# Patient Record
Sex: MALE | Race: WHITE
[De-identification: names, ages, dates, MRNs, and addresses within clinical notes are randomized per-mention and may not be internally consistent; named-entity substitution may affect disease eponyms.]

---

## 2018-03-01 ENCOUNTER — HOSPITAL ENCOUNTER (OUTPATIENT)
Dept: HOSPITAL 92 - ULT | Age: 38
Discharge: HOME | End: 2018-03-01
Attending: FAMILY MEDICINE
Payer: COMMERCIAL

## 2018-03-01 DIAGNOSIS — N50.819: Primary | ICD-10-CM

## 2018-03-01 DIAGNOSIS — N43.3: ICD-10-CM

## 2018-03-01 PROCEDURE — 93976 VASCULAR STUDY: CPT

## 2018-03-01 PROCEDURE — 76870 US EXAM SCROTUM: CPT

## 2018-03-01 NOTE — ULT
TESTICULAR ULTRASOUND:

 

Date:  03/01/18 

 

HISTORY:  

Testicular pain. 

 

FINDINGS:

Right testicle measures 5.3 cm x 3.4 cm x 2.8 cm. Left testicle measures 5.2 cm x 3.5 cm x 2.9 cm. Th
ere is slight heterogeneity of each testicle, which is a symmetric finding bilaterally. No testicular
 mass is present. There are a few very tiny punctate foci of increased echogenicity in the left testi
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 

 

Doppler evaluation of each testicle with spectral analysis and color flow evaluation demonstrates art
erial and venous flow in each testicle. 

 

There is a small hypoechoic structure within the right epididymis measuring 0.5 cm, which may represe
nt a small spermatocele. There is also a small anechoic structure seen in the left epididymal head me
asuring 0.3 cm, probably related to a small epididymal cyst. 

 

There is a small left hydrocele with physiologic amount of fluid adjacent to the right testicle. 

 

IMPRESSION: 

1.  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 Testicles otherwise have a normal sonographi
c appearance with arterial and venous flow documented in each testicle. 

 

2.  Small epididymal cyst versus spermatocele in the right epididymal head with tiny epididymal cyst 
in the left epididymal head. 

 

3.  Very small left hydrocele. 

 

 

 

POS: [facility]

## 2019-03-07 ENCOUNTER — HOSPITAL ENCOUNTER (OUTPATIENT)
Dept: HOSPITAL 92 - SDC | Age: 39
Discharge: HOME | End: 2019-03-07
Attending: OTOLARYNGOLOGY
Payer: COMMERCIAL

## 2019-03-07 VITALS — BODY MASS INDEX: 39.9 KG/M2

## 2019-03-07 DIAGNOSIS — J34.2: ICD-10-CM

## 2019-03-07 DIAGNOSIS — J34.3: ICD-10-CM

## 2019-03-07 DIAGNOSIS — Z79.899: ICD-10-CM

## 2019-03-07 DIAGNOSIS — J32.9: Primary | ICD-10-CM

## 2019-03-07 DIAGNOSIS — F32.9: ICD-10-CM

## 2019-03-07 DIAGNOSIS — F90.9: ICD-10-CM

## 2019-03-07 DIAGNOSIS — J33.8: ICD-10-CM

## 2019-03-07 DIAGNOSIS — J34.89: ICD-10-CM

## 2019-03-07 PROCEDURE — 09TV8ZZ RESECTION OF LEFT ETHMOID SINUS, VIA NATURAL OR ARTIFICIAL OPENING ENDOSCOPIC: ICD-10-PCS | Performed by: OTOLARYNGOLOGY

## 2019-03-07 PROCEDURE — 09TL8ZZ RESECTION OF NASAL TURBINATE, VIA NATURAL OR ARTIFICIAL OPENING ENDOSCOPIC: ICD-10-PCS | Performed by: OTOLARYNGOLOGY

## 2019-03-07 PROCEDURE — 099R8ZZ DRAINAGE OF LEFT MAXILLARY SINUS, VIA NATURAL OR ARTIFICIAL OPENING ENDOSCOPIC: ICD-10-PCS | Performed by: OTOLARYNGOLOGY

## 2019-03-07 PROCEDURE — 099X8ZZ DRAINAGE OF LEFT SPHENOID SINUS, VIA NATURAL OR ARTIFICIAL OPENING ENDOSCOPIC: ICD-10-PCS | Performed by: OTOLARYNGOLOGY

## 2019-03-07 PROCEDURE — 099T8ZZ DRAINAGE OF LEFT FRONTAL SINUS, VIA NATURAL OR ARTIFICIAL OPENING ENDOSCOPIC: ICD-10-PCS | Performed by: OTOLARYNGOLOGY

## 2019-03-07 PROCEDURE — 09TU8ZZ RESECTION OF RIGHT ETHMOID SINUS, VIA NATURAL OR ARTIFICIAL OPENING ENDOSCOPIC: ICD-10-PCS | Performed by: OTOLARYNGOLOGY

## 2019-03-07 PROCEDURE — 099W8ZZ DRAINAGE OF RIGHT SPHENOID SINUS, VIA NATURAL OR ARTIFICIAL OPENING ENDOSCOPIC: ICD-10-PCS | Performed by: OTOLARYNGOLOGY

## 2019-03-07 PROCEDURE — 099Q8ZZ DRAINAGE OF RIGHT MAXILLARY SINUS, VIA NATURAL OR ARTIFICIAL OPENING ENDOSCOPIC: ICD-10-PCS | Performed by: OTOLARYNGOLOGY

## 2019-03-07 PROCEDURE — 09SL8ZZ REPOSITION NASAL TURBINATE, VIA NATURAL OR ARTIFICIAL OPENING ENDOSCOPIC: ICD-10-PCS | Performed by: OTOLARYNGOLOGY

## 2019-03-07 PROCEDURE — 099S8ZZ DRAINAGE OF RIGHT FRONTAL SINUS, VIA NATURAL OR ARTIFICIAL OPENING ENDOSCOPIC: ICD-10-PCS | Performed by: OTOLARYNGOLOGY

## 2019-03-07 PROCEDURE — 09SM0ZZ REPOSITION NASAL SEPTUM, OPEN APPROACH: ICD-10-PCS | Performed by: OTOLARYNGOLOGY

## 2019-03-08 NOTE — OP
DATE OF PROCEDURE:  03/07/2019



PREOPERATIVE DIAGNOSES:  Chronic sinusitis, nasal polyposis, bilateral beronica

bullosa, deviated septum with spur, hypertrophied inferior turbinates. 



POSTOPERATIVE DIAGNOSES:  Chronic sinusitis, nasal polyposis, bilateral beronica

bullosa, deviated septum with spur, hypertrophied inferior turbinates. 



PROCEDURES PERFORMED:  

1. Bilateral nasal endoscopy with resection of beronica bullosa.

2. Bilateral nasal endoscopy with total ethmoidectomy.

3. Bilateral nasal endoscopy with maxillary antrostomy.

4. Bilateral nasal endoscopy with sphenoidotomy.

5. Bilateral nasal endoscopy with frontal sinusotomy.

6. Septoplasty.

7. Bilateral nasal endoscopy with submucosal resection of inferior turbinates.



DESCRIPTION OF PROCEDURE:  BILATERAL NASAL ENDOSCOPY WITH RESECTION OF BERONICA

BULLOSA: 



The beronica bullosa was identified and entered with a sickle blade.  The lateral

aspect of the beronica bullosa was meticulously resected while leaving the medial most

aspect to form the new middle turbinate.  Attention was made not to violate the

mucosa.  The straight biting punches and micro-debrider were used to remove shrouds

of mucosa and bony debris. 



BILATERAL NASAL ENDOSCOPY WITH TOTAL ETHMOIDECTOMY:   



The anterior face of the ethmoid bulla was entered and with the micro-debrider,

dissection continued posteriorly to the ground lamella.  The limits of dissection

included the insertion of the middle turbinate, medial orbital wall, and base of

skull.  We similarly identified the frontal recess and removed shrouds of bone and

debris in that region to obtain patency into the agger nasi region and frontal

recess.  We then entered the ground lamella and its anteroinferior aspect and

proceeded posteriorly, opening the posterior ethmoid air-cell system.  Again, the

limits of dissection included the base of skull and medial orbital wall. 



BILATERAL NASAL ENDOSCOPY WITH MAXILLARY ANTROSTOMY:   



The uncinate was then identified and the extent of the uncinate was appreciated by

out-fracturing the uncinate with the ball-tip probe.  We then used the sickle blade

to disarticulate the uncinate from the lateral nasal wall.  This was then removed

with straight biting and upbiting punches with the remaining shrouds of mucosa and

bony septum removed with the micro-debrider.  The natural os of the maxillary sinus

was then identified and enlarged with the maxillary punches and back biting forceps. 



BILATERAL NASAL ENDOSCOPY WITH SPHENOIDOTOMY:   



The anterior face of the sphenoid was identified and entered in its extreme

anteroinferior aspect.  A sphenoid punch was then used to enlarge the sphenoidotomy

and no injury to the optic nerve or internal carotid artery occurred. 



BILATERAL NASAL ENDOSCOPY WITH FRONTAL SINUSOTOMY:   



Following the ethmoidectomy, we then turned our attention to the frontal nasal

recess. The agger nasi cells were addressed and the frontal recess was exposed. The

natural opening to the frontal sinus was identified. At this point, any obstructing

shrouds of mucosa and bony fragments were removed with a curved microdebrider. The

wound was then examined and found to be free of any obstructing debris. We then

turned our attention to the contralateral side and performed a similar procedure

again under endoscopic visualization using a 45-degree scope. We were able to

visualize the frontal recess. Obstructing shrouds of mucosa and bone were removed

with a microdebrider. The natural os of frontal sinus was identified and enlarged

and irrigated.   At this point, the frontal sinusotomy was completed and we turned

to the next area of concern. 



SEPTOPLASTY:   



After local anesthesia was infiltrated into the submucoperichondrial plane, a

standard Rolando incision was made with a #15 blade down to the level of the septal

cartilage.  The caudal elevator was used to elevate the mucoperichondrium from the

underlying cartilage.  We then proceeded beyond the bony cartilaginous junction and

elevated the bony periosteum as well.  Great attention was paid to the spur to

prevent rent formation in the septal flap. A transcartilaginous incision was then

made, while preserving an adequate dorsal and caudal cartilaginous strut for tip

support.  The deformed cartilage was removed and disarticulated from the bony

cartilaginous junction and maxillary crest.  This was placed in saline and would

later be crushed and returned to the mucoperichondrial envelope.  We then elevated

the contralateral periosteum from the bony cartilaginous region and removed the

deformed portions of the bone and bony spurs.  The cartilage was then crushed and

placed back into the mucoperichondrial envelope and the mucosa was re-approximated

with a quilting stitch composed of rapidly absorbent gut suture.  The Rolando

incision was also closed with interrupted gut suture.  At the completion of the

case, Cordon splints were placed and suture secured to the caudal septum. 



BILATERAL NASAL ENDOSCOPY WITH SUBMUCOSAL RESECTION OF INFERIOR TURBINATES:   



After consent was obtained, the patient was identified, brought to the operating

room, and placed on the operating room table in the supine position.  Consent was

obtained, notifying the patient of the possibility of additional infections,

bleeding, brain injury, and eye/orbital injury.   The patient was placed on the

operating room table, and general endotracheal anesthesia and intravenous access was

obtained.  The patient was then positioned, prepped and draped for endoscopic sinus

surgery.  Nasal preparation included trimming nasal vestibular hairs and spraying in

topical Afrin.  We then placed Afrin topical solution on nasal pledgets and

strategically located them intranasally.  The perinasal mucosa was injected with 1%

lidocaine with 1:100,000 epinephrine in the submucoperichondrial plane of the

septum, lateral nasal wall, and anterior to the uncinate.  The patient was then

prepped and draped in a sterile fashion and positioned for endoscopic sinus surgery. 





With the 0-degree endoscope, the patient underwent systematic nasal endoscopy.

There were no suspicious internasal masses or lesions identified.  We then focused

our attention to the osteomeatal complex region under the middle turbinate. 





The inferior turbinates were visualized with a 0 degree endoscope and outfractured

with a [place] elevator.  The inferior medial aspect was cauterized with the

electrocautery.  Hemostasis was obtained .  After adequate airway was established,

we turned our attention to the contralateral side and used a similar procedure.

Again, a Derrick elevator was used to outfracture inferior turbinates under endoscopic

visualization.  With a suction cautery, the free inferior medial aspect was

cauterized under direct visualization along the length of the inferior turbinate. 





At this point, we then turned our attention to the contralateral side and proceeded

with endoscopic sinus surgery. 



At the completion of the case, Rice keel splints were placed in the ethmoid cavities

after the ethmoidectomy.  There were no complications.  The patient tolerated the

procedure well and was discharged to the recovery room in stable condition prior to

return to the preoperative day stay with ultimate discharge home.  Prescriptions for

pain medication and antibiotics were provided.  The patient received intramuscular

Depo-Medrol during the case. 







Job ID:  [id number]

## 2019-11-17 ENCOUNTER — HOSPITAL ENCOUNTER (EMERGENCY)
Dept: HOSPITAL 92 - SCSER | Age: 39
Discharge: HOME | End: 2019-11-17
Payer: COMMERCIAL

## 2019-11-17 DIAGNOSIS — N13.2: Primary | ICD-10-CM

## 2019-11-17 DIAGNOSIS — F43.10: ICD-10-CM

## 2019-11-17 LAB
ALBUMIN SERPL BCG-MCNC: 4.3 G/DL (ref 3.5–5)
ALP SERPL-CCNC: 57 U/L (ref 40–110)
ALT SERPL W P-5'-P-CCNC: 27 U/L (ref 8–55)
ANION GAP SERPL CALC-SCNC: 16 MMOL/L (ref 10–20)
AST SERPL-CCNC: 18 U/L (ref 5–34)
BACTERIA UR QL AUTO: (no result) HPF
BASOPHILS # BLD AUTO: 0.1 THOU/UL (ref 0–0.2)
BASOPHILS NFR BLD AUTO: 1.1 % (ref 0–1)
BILIRUB SERPL-MCNC: 0.4 MG/DL (ref 0.2–1.2)
BUN SERPL-MCNC: 19 MG/DL (ref 8.9–20.6)
CALCIUM SERPL-MCNC: 8.7 MG/DL (ref 7.8–10.44)
CHLORIDE SERPL-SCNC: 105 MMOL/L (ref 98–107)
CO2 SERPL-SCNC: 24 MMOL/L (ref 22–29)
CREAT CL PREDICTED SERPL C-G-VRATE: 0 ML/MIN (ref 70–130)
EOSINOPHIL # BLD AUTO: 0.4 THOU/UL (ref 0–0.7)
EOSINOPHIL NFR BLD AUTO: 3 % (ref 0–10)
GLOBULIN SER CALC-MCNC: 2.7 G/DL (ref 2.4–3.5)
GLUCOSE SERPL-MCNC: 102 MG/DL (ref 70–105)
HGB BLD-MCNC: 15.3 G/DL (ref 14–18)
LYMPHOCYTES # BLD: 4.3 THOU/UL (ref 1.2–3.4)
LYMPHOCYTES NFR BLD AUTO: 35.4 % (ref 21–51)
MCH RBC QN AUTO: 28.7 PG (ref 27–31)
MCV RBC AUTO: 84.5 FL (ref 78–98)
MONOCYTES # BLD AUTO: 1 THOU/UL (ref 0.11–0.59)
MONOCYTES NFR BLD AUTO: 8.1 % (ref 0–10)
NEUTROPHILS # BLD AUTO: 6.3 THOU/UL (ref 1.4–6.5)
NEUTROPHILS NFR BLD AUTO: 52.4 % (ref 42–75)
PLATELET # BLD AUTO: 256 THOU/UL (ref 130–400)
POTASSIUM SERPL-SCNC: 3.6 MMOL/L (ref 3.5–5.1)
RBC # BLD AUTO: 5.32 MILL/UL (ref 4.7–6.1)
RBC UR QL AUTO: (no result) HPF (ref 0–3)
SODIUM SERPL-SCNC: 141 MMOL/L (ref 136–145)
WBC # BLD AUTO: 12.1 THOU/UL (ref 4.8–10.8)

## 2019-11-17 PROCEDURE — 76870 US EXAM SCROTUM: CPT

## 2019-11-17 PROCEDURE — 96374 THER/PROPH/DIAG INJ IV PUSH: CPT

## 2019-11-17 PROCEDURE — 81003 URINALYSIS AUTO W/O SCOPE: CPT

## 2019-11-17 PROCEDURE — 80053 COMPREHEN METABOLIC PANEL: CPT

## 2019-11-17 PROCEDURE — 81015 MICROSCOPIC EXAM OF URINE: CPT

## 2019-11-17 PROCEDURE — 93976 VASCULAR STUDY: CPT

## 2019-11-17 PROCEDURE — 74176 CT ABD & PELVIS W/O CONTRAST: CPT

## 2019-11-17 PROCEDURE — 96376 TX/PRO/DX INJ SAME DRUG ADON: CPT

## 2019-11-17 PROCEDURE — 96375 TX/PRO/DX INJ NEW DRUG ADDON: CPT

## 2019-11-17 PROCEDURE — 85025 COMPLETE CBC W/AUTO DIFF WBC: CPT

## 2019-11-17 PROCEDURE — 96361 HYDRATE IV INFUSION ADD-ON: CPT

## 2019-11-17 NOTE — CT
CT ABDOMEN AND PELVIS WITHOUT CONTRAST:

 

Date:  11/17/19 

 

HISTORY:  

Left-sided abdominal and flank pain. 

 

FINDINGS:

Absence of oral and IV contrast reduces the sensitivity of exam, particularly for evaluation of solid
 organs and bowel. 

 

The lung bases are unremarkable. No free air or free fluid is seen in the abdomen or pelvis. No calci
fied gallstones are noted. A tiny appendicolith is seen. The appendix is otherwise normal. 

 

No calculi seen in either kidney, right ureter, or the urinary bladder. There is a 4 mm calculus in t
he left UPJ with mild left-sided hydronephrosis. No right-sided hydroureteronephrosis is seen. 

 

There are small bilateral fat-containing inguinal hernia. No acute osseous abnormalities noted. 

 

IMPRESSION: 

4 mm left UPJ calculus with mild hydronephrosis. 

 

 

POS: [facility]

## 2019-11-17 NOTE — ULT
BILATERAL TESTICULAR ULTRASOUND WITH GRAY SCALE AND COLOR FLOW AND SPECTRAL DOPPLER IMAGING:

 

Date:  11/17/19 

 

HISTORY:  

39-year-old male with left-sided testicular pain. Patient had vasectomy 2 years ago. 

 

FINDINGS:

The right testis measures 5.1 x 2.8 x 3.2 cm. 

The left testis measures 5.3 x 2.7 x 3.3 cm. 

No testicular mass or microlithiasis seen. 

Symmetric flow seen to the tests and epididymides. 

There are small bilateral hydroceles. 

A 5 mm epididymal head cyst is seen on the left. 

 

IMPRESSION: 

1.  No evidence of testicular mass or torsion. 

2.  5 mm left epididymal head cyst/spermatocele. 

 

 

POS: [facility]

## 2019-11-21 ENCOUNTER — HOSPITAL ENCOUNTER (OUTPATIENT)
Dept: HOSPITAL 92 - SLEEPLAB | Age: 39
Discharge: HOME | End: 2019-11-21
Attending: OTOLARYNGOLOGY
Payer: COMMERCIAL

## 2019-11-21 DIAGNOSIS — G47.33: Primary | ICD-10-CM

## 2019-11-21 DIAGNOSIS — G47.9: ICD-10-CM

## 2019-11-21 DIAGNOSIS — R06.83: ICD-10-CM

## 2019-11-21 PROCEDURE — 95806 SLEEP STUDY UNATT&RESP EFFT: CPT

## 2021-03-22 ENCOUNTER — HOSPITAL ENCOUNTER (OUTPATIENT)
Dept: HOSPITAL 92 - BICRAD | Age: 41
Discharge: HOME | End: 2021-03-22
Attending: INTERNAL MEDICINE
Payer: COMMERCIAL

## 2021-03-22 DIAGNOSIS — R06.00: Primary | ICD-10-CM

## 2021-03-22 PROCEDURE — 71046 X-RAY EXAM CHEST 2 VIEWS: CPT

## 2022-06-08 ENCOUNTER — HOSPITAL ENCOUNTER (EMERGENCY)
Dept: HOSPITAL 92 - CSHERS | Age: 42
Discharge: HOME | End: 2022-06-08
Payer: COMMERCIAL

## 2022-06-08 DIAGNOSIS — M25.571: Primary | ICD-10-CM

## 2023-08-11 ENCOUNTER — HOSPITAL ENCOUNTER (OUTPATIENT)
Dept: HOSPITAL 92 - BICMRI | Age: 43
Discharge: HOME | End: 2023-08-11
Attending: NEUROLOGICAL SURGERY
Payer: COMMERCIAL

## 2023-08-11 DIAGNOSIS — M50.322: ICD-10-CM

## 2023-08-11 DIAGNOSIS — S06.9X0A: ICD-10-CM

## 2023-08-11 DIAGNOSIS — M50.31: ICD-10-CM

## 2023-08-11 DIAGNOSIS — M54.12: Primary | ICD-10-CM

## 2023-08-11 PROCEDURE — 72141 MRI NECK SPINE W/O DYE: CPT

## 2023-08-11 PROCEDURE — 70551 MRI BRAIN STEM W/O DYE: CPT

## 2024-10-31 ENCOUNTER — HOSPITAL ENCOUNTER (OUTPATIENT)
Dept: HOSPITAL 92 - SCSRAD | Age: 44
Discharge: HOME | End: 2024-10-31
Payer: COMMERCIAL

## 2024-10-31 DIAGNOSIS — R07.81: Primary | ICD-10-CM

## 2024-10-31 PROCEDURE — 71046 X-RAY EXAM CHEST 2 VIEWS: CPT

## 2025-07-31 ENCOUNTER — HOSPITAL ENCOUNTER (OUTPATIENT)
Dept: HOSPITAL 92 - SCSMRI | Age: 45
Discharge: HOME | End: 2025-07-31
Attending: FAMILY MEDICINE
Payer: COMMERCIAL

## 2025-07-31 DIAGNOSIS — M21.371: ICD-10-CM

## 2025-07-31 DIAGNOSIS — M48.061: ICD-10-CM

## 2025-07-31 DIAGNOSIS — M47.26: Primary | ICD-10-CM

## 2025-07-31 DIAGNOSIS — M51.16: ICD-10-CM

## 2025-07-31 PROCEDURE — 72148 MRI LUMBAR SPINE W/O DYE: CPT
